# Patient Record
Sex: FEMALE | Race: WHITE | NOT HISPANIC OR LATINO | URBAN - METROPOLITAN AREA
[De-identification: names, ages, dates, MRNs, and addresses within clinical notes are randomized per-mention and may not be internally consistent; named-entity substitution may affect disease eponyms.]

---

## 2024-02-17 ENCOUNTER — OFFICE VISIT (OUTPATIENT)
Dept: URGENT CARE | Facility: CLINIC | Age: 44
End: 2024-02-17
Payer: COMMERCIAL

## 2024-02-17 VITALS
BODY MASS INDEX: 32.36 KG/M2 | HEART RATE: 82 BPM | DIASTOLIC BLOOD PRESSURE: 87 MMHG | TEMPERATURE: 97.5 F | HEIGHT: 65 IN | OXYGEN SATURATION: 98 % | WEIGHT: 194.2 LBS | SYSTOLIC BLOOD PRESSURE: 148 MMHG

## 2024-02-17 DIAGNOSIS — R19.5 CHANGE IN CONSISTENCY OF STOOL: ICD-10-CM

## 2024-02-17 DIAGNOSIS — R82.998 LEUKOCYTES IN URINE: ICD-10-CM

## 2024-02-17 DIAGNOSIS — R10.30 LOWER ABDOMINAL PAIN: ICD-10-CM

## 2024-02-17 DIAGNOSIS — R19.5 MUCUS IN STOOL: Primary | ICD-10-CM

## 2024-02-17 LAB
POC APPEARANCE, URINE: CLEAR
POC BILIRUBIN, URINE: NEGATIVE
POC BLOOD, URINE: ABNORMAL
POC COLOR, URINE: ABNORMAL
POC GLUCOSE, URINE: NEGATIVE MG/DL
POC KETONES, URINE: NEGATIVE MG/DL
POC LEUKOCYTES, URINE: ABNORMAL
POC NITRITE,URINE: NEGATIVE
POC PH, URINE: 6 PH
POC PROTEIN, URINE: NEGATIVE MG/DL
POC SPECIFIC GRAVITY, URINE: 1.01
POC UROBILINOGEN, URINE: 0.2 EU/DL

## 2024-02-17 PROCEDURE — 87086 URINE CULTURE/COLONY COUNT: CPT

## 2024-02-17 PROCEDURE — 99214 OFFICE O/P EST MOD 30 MIN: CPT | Performed by: PHYSICIAN ASSISTANT

## 2024-02-17 PROCEDURE — 81003 URINALYSIS AUTO W/O SCOPE: CPT | Mod: CLIA WAIVED TEST | Performed by: PHYSICIAN ASSISTANT

## 2024-02-17 RX ORDER — DICYCLOMINE HYDROCHLORIDE 20 MG/1
20 TABLET ORAL 4 TIMES DAILY PRN
Qty: 56 TABLET | Refills: 0 | Status: SHIPPED | OUTPATIENT
Start: 2024-02-17 | End: 2024-03-02

## 2024-02-17 NOTE — PROGRESS NOTES
"Kenzie Maloney is a 44 y.o. female who presents for Abdominal Pain.    Pt states that she started 2 weeks ago she started with liquid, yellow, mucous stool. Reports that her stomach has been swollen as well. States that she had a smoothie with orange juice and shortly after, she began having significant vomiting and they mucousy diarrhea. Tried naproxen for her abdominal pain and Gas-X without relief. She has been unable to eat a substantial amount because shortly after she eats, her stomach pain gets worse. States that when she had gas and stool \"had an abnormally awful smell - she has never smelled anything like that before.\" Denies having abx before these Sx started. Her mom has diverticulosis and bouts of diverticulitis. States that she has been feeling very tired, weak. Thought it was something she ate, but it has now lasted too long. Denies seeing any blood in the stool, no black stools. States that she was having around 1-2 BM daily. Denies any continued nausea or vomiting. Denies any continued fevers. She has been taking a lot of fluids to compensate. The stool is still yellow, but getting slightly more solid.       History provided by:  Patient   used: No        /87 (BP Location: Left arm, Patient Position: Sitting, BP Cuff Size: Small adult)   Pulse 82   Temp 36.4 °C (97.5 °F) (Oral)   Ht 1.651 m (5' 5\")   Wt 88.1 kg (194 lb 3.2 oz)   LMP 02/14/2024   SpO2 98%   BMI 32.32 kg/m²    All vitals have been reviewed and are stable.    Review of Systems   All other systems reviewed and are negative.      Objective     Physical Exam  Vitals reviewed.   Constitutional:       General: She is awake.      Appearance: Normal appearance. She is well-developed.   HENT:      Head: Normocephalic and atraumatic.   Cardiovascular:      Rate and Rhythm: Normal rate.   Pulmonary:      Effort: Pulmonary effort is normal.   Abdominal:      General: Bowel sounds are normal. There is " distension (mild in the lower abdomen).      Palpations: Abdomen is soft. There is no pulsatile mass.      Tenderness: There is generalized abdominal tenderness (lower abdomen). Negative signs include Beltre's sign, Rovsing's sign and McBurney's sign.      Hernia: No hernia is present.   Musculoskeletal:      Cervical back: Full passive range of motion without pain.      Right lower leg: No edema.      Left lower leg: No edema.   Skin:     General: Skin is warm and dry.      Findings: No lesion or rash.   Neurological:      General: No focal deficit present.      Mental Status: She is alert and oriented to person, place, and time.      Cranial Nerves: No facial asymmetry.      Motor: Motor function is intact.      Gait: Gait is intact.   Psychiatric:         Attention and Perception: Attention normal.         Mood and Affect: Mood and affect normal.         Assessment/Plan   Problem List Items Addressed This Visit    None  Visit Diagnoses       Mucus in stool    -  Primary    Relevant Orders    Stool Pathogen Panel, PCR    Lactoferrin, Fecal, Quantitative    C. difficile, PCR    Ova/Para + Giardia/Cryptosporidium Antigen    Lower abdominal pain        Relevant Medications    dicyclomine (Bentyl) 20 mg tablet    Other Relevant Orders    POCT UA Automated manually resulted (Completed)    Change in consistency of stool        Relevant Orders    Stool Pathogen Panel, PCR    Lactoferrin, Fecal, Quantitative    C. difficile, PCR    Ova/Para + Giardia/Cryptosporidium Antigen          Bentyl Rx for symptoms. Advised that stool studies would be an appropriate first step in evaluation. Should provide a loose sample if possible. Given stool sample kit and lab hour so pt knows a good time to drop these off. If at any time significantly bloody or any black stools, or worsening abd pain should report to ER for further evaluation. UA relatively unremarkable aside from trace leuks, will send for culture. Blood likely secondary to  active menstruation.     Red flag symptoms reviewed with patient and all questions answered. Patient or parent/guardian verbalized understanding and agreement with care plan as above. All in office testing reviewed with patient. If symptoms worsen or do not improve, patient is to follow up with PCP or report to the ER.

## 2024-02-19 LAB — BACTERIA UR CULT: NO GROWTH

## 2024-07-12 ENCOUNTER — OFFICE VISIT (OUTPATIENT)
Dept: URGENT CARE | Facility: CLINIC | Age: 44
End: 2024-07-12
Payer: COMMERCIAL

## 2024-07-12 VITALS
TEMPERATURE: 98.3 F | OXYGEN SATURATION: 96 % | DIASTOLIC BLOOD PRESSURE: 95 MMHG | SYSTOLIC BLOOD PRESSURE: 135 MMHG | HEART RATE: 80 BPM

## 2024-07-12 DIAGNOSIS — M79.604 BILATERAL LEG AND FOOT PAIN: ICD-10-CM

## 2024-07-12 DIAGNOSIS — M79.672 BILATERAL LEG AND FOOT PAIN: ICD-10-CM

## 2024-07-12 DIAGNOSIS — M79.671 BILATERAL LEG AND FOOT PAIN: ICD-10-CM

## 2024-07-12 DIAGNOSIS — M79.605 BILATERAL LEG AND FOOT PAIN: ICD-10-CM

## 2024-07-12 DIAGNOSIS — R11.2 NAUSEA AND VOMITING, UNSPECIFIED VOMITING TYPE: ICD-10-CM

## 2024-07-12 DIAGNOSIS — M54.50 ACUTE BILATERAL LOW BACK PAIN, UNSPECIFIED WHETHER SCIATICA PRESENT: Primary | ICD-10-CM

## 2024-07-12 PROBLEM — J20.9 ACUTE BRONCHITIS: Status: RESOLVED | Noted: 2024-07-12 | Resolved: 2024-07-12

## 2024-07-12 PROBLEM — R07.9 CHEST PAIN, UNSPECIFIED: Status: RESOLVED | Noted: 2023-05-08 | Resolved: 2024-07-12

## 2024-07-12 PROBLEM — D25.9 UTERINE LEIOMYOMA: Status: ACTIVE | Noted: 2024-07-12

## 2024-07-12 PROBLEM — B48.8 OPPORTUNISTIC MYCOSIS (MULTI): Status: RESOLVED | Noted: 2024-07-12 | Resolved: 2024-07-12

## 2024-07-12 PROBLEM — M54.40 ACUTE BACK PAIN WITH SCIATICA: Status: ACTIVE | Noted: 2024-07-12

## 2024-07-12 PROBLEM — Z87.891 PERSONAL HISTORY OF NICOTINE DEPENDENCE: Status: ACTIVE | Noted: 2023-05-08

## 2024-07-12 PROBLEM — R35.0 URINARY FREQUENCY: Status: RESOLVED | Noted: 2024-07-12 | Resolved: 2024-07-12

## 2024-07-12 LAB
POC APPEARANCE, URINE: CLEAR
POC BILIRUBIN, URINE: NEGATIVE
POC BLOOD, URINE: ABNORMAL
POC COLOR, URINE: YELLOW
POC GLUCOSE, URINE: NEGATIVE MG/DL
POC KETONES, URINE: NEGATIVE MG/DL
POC LEUKOCYTES, URINE: NEGATIVE
POC NITRITE,URINE: NEGATIVE
POC PH, URINE: 7 PH
POC PROTEIN, URINE: NEGATIVE MG/DL
POC SPECIFIC GRAVITY, URINE: 1.02
POC UROBILINOGEN, URINE: 0.2 EU/DL

## 2024-07-12 RX ORDER — TIZANIDINE 4 MG/1
4 TABLET ORAL EVERY 8 HOURS PRN
Qty: 21 TABLET | Refills: 0 | Status: SHIPPED | OUTPATIENT
Start: 2024-07-12 | End: 2024-07-19

## 2024-07-12 RX ORDER — KETOROLAC TROMETHAMINE 30 MG/ML
60 INJECTION, SOLUTION INTRAMUSCULAR; INTRAVENOUS ONCE
Status: COMPLETED | OUTPATIENT
Start: 2024-07-12 | End: 2024-07-12

## 2024-07-12 RX ORDER — ONDANSETRON 4 MG/1
4 TABLET, FILM COATED ORAL EVERY 8 HOURS PRN
Qty: 20 TABLET | Refills: 0 | Status: SHIPPED | OUTPATIENT
Start: 2024-07-12 | End: 2024-07-19

## 2024-07-12 RX ORDER — PREDNISONE 10 MG/1
TABLET ORAL
Qty: 25 TABLET | Refills: 0 | Status: SHIPPED | OUTPATIENT
Start: 2024-07-12 | End: 2024-07-23

## 2024-07-12 ASSESSMENT — ENCOUNTER SYMPTOMS
CONSTITUTIONAL NEGATIVE: 1
LOSS OF SENSATION: 0
INABILITY TO BEAR WEIGHT: 0
VOMITING: 0
RESPIRATORY NEGATIVE: 1
TINGLING: 0
NUMBNESS: 0
MUSCLE WEAKNESS: 0
DYSURIA: 0
RHINORRHEA: 0
NEUROLOGICAL NEGATIVE: 1
DIARRHEA: 0
NECK PAIN: 0
FLANK PAIN: 0
ARTHRALGIAS: 1
DIZZINESS: 0
NAUSEA: 0
SORE THROAT: 0
FEVER: 0
TROUBLE SWALLOWING: 0
VOICE CHANGE: 0
BACK PAIN: 1
GASTROINTESTINAL NEGATIVE: 1
LEG PAIN: 1
FATIGUE: 0
DIAPHORESIS: 0
LOSS OF MOTION: 0
FACIAL SWELLING: 0
COUGH: 0
WOUND: 0
JOINT SWELLING: 0
MYALGIAS: 0
HEADACHES: 0
FREQUENCY: 0
BRUISES/BLEEDS EASILY: 1
SHORTNESS OF BREATH: 0
CHILLS: 0
CARDIOVASCULAR NEGATIVE: 1
PALPITATIONS: 0
ABDOMINAL PAIN: 0
COLOR CHANGE: 0

## 2024-07-12 ASSESSMENT — VISUAL ACUITY: OU: 1

## 2024-07-12 NOTE — PATIENT INSTRUCTIONS
BACK PAIN / MUSCLE SPASM    - TORADOL injection was provided in office for more rapid pain/inflammation relief  - Xray of the low back was ordered to evaluate for malalignment/injury  - TIZANIDINE is a muscle relaxer that helps reduce pain-inducing spasms  - PREDNISONE (steroid) has been prescribed to reduce nerve inflammation and pain    - Resting the affected areas is critical to healing  - NSAIDs (Ibuprofen or naproxen) may be taken every 4-6 hours for pain relief and decreased inflammation  - Use Ice (or other cold object) for at least 6 hours after the injury. Some people find it helpful to ice up to 2 days after an injury. Use cold gel pack, bag of ice, or bag of frozen vegetables on the painful muscle for 15 min, every 1 to 2 hours.  Put a thin towel between the ice (or other cold object) and your skin.  - Alternate icing and heating if possible with heating pads or warm water  - Gentle stretching without weight bearing can help prevent immobility while activity is decreased  - Yoga or seeking out chiropractor care may be beneficial    - If symptoms persist or do not improve, follow up with an orthopedic or rheumatologic specialist    - If loss of bladder or bowel control, numbness of groin region, numbness or weakness of lower extremities occurs, or symptoms continue to worsen go to the ER immediately    Follow up with PCP in 4-6 weeks, if you need a referral, please call our office.     Steroids are strong medications that mimic the body's natural production of cortisol and is prescribed to reduce inflammation especially when pain is caused by inflammation. Short-term side effects of steroids reviewed, including gastritis, insomnia, moodiness, acne, fluid retention (leg swelling) and potential for increase in blood pressure or sugar. If you have have a history of hypertension or diabetes, you should monitor at home regularly while on this medication and discontinue if levels are high (BP>160/90 or glucose  >200). Steroids are often prescribed to be tapered off into lower doses after prolonged use to avoid withdrawal symptoms such as fatigue, weakness, body aches, nausea, and lightheadedness.  However, if a severe reaction is noted (including rash or difficulty breathing) discontinue and seek emergency care immediately.

## 2024-07-12 NOTE — PROGRESS NOTES
Subjective   History  Ramila Maloney is a 44 y.o. female who presents for Back Pain and Leg Pain.    Patient presents with bilateral leg pain that started in her feet and then slowly moved up her legs and to her low back pain over the last week. She reports constant throbbing, cramping, and sore feeling of her legs and no relief with rest or changes in position. She reports that it is so painful she feels like she could cry, but has been holding it back and she is starting to get nauseous. She reports that the heat and ice both made her pain much worse and she feels like her skin is very sensitive like any breeze across her legs would make the pain worse. Patient denies any injury or other changes in lifestyle, numbness/tingling sensations, pain in her hands, numbness in her pelvic region, or any urinary discomfort/changes. She reports that she does usually get chronic pain of her feet and has had sciatica before, but this started differently and is equal and generalized throughout her whole legs. Patient reports trying OTC medications including Acetaminophen/Ibuprofen, rest, elevation, heat and ice with minimal relief. Patient reports that rubbing her legs seems to help reduce the pain only while she is doing it. She is currently on her period.       History provided by:  Patient and medical records  Back Pain  Associated symptoms include leg pain. Pertinent negatives include no abdominal pain, chest pain, dysuria, fever, headaches, numbness, pelvic pain or tingling.   Leg Pain   There was no injury mechanism. The quality of the pain is described as cramping, burning and aching. The pain is severe. The pain has been Constant since onset. Pertinent negatives include no inability to bear weight, loss of motion, loss of sensation, muscle weakness, numbness or tingling. The symptoms are aggravated by movement. She has tried elevation, ice, rest, NSAIDs, heat and acetaminophen for the symptoms. The treatment provided no  relief.     No past surgical history on file.  Social History     Tobacco Use    Smoking status: Not on file    Smokeless tobacco: Not on file   Substance Use Topics    Alcohol use: Not on file    Drug use: Not on file       Review of Systems   Review of Systems   Constitutional: Negative.  Negative for chills, diaphoresis, fatigue and fever.   HENT: Negative.  Negative for congestion, facial swelling, rhinorrhea, sore throat, trouble swallowing and voice change.    Respiratory: Negative.  Negative for cough and shortness of breath.    Cardiovascular: Negative.  Negative for chest pain and palpitations.   Gastrointestinal: Negative.  Negative for abdominal pain, diarrhea, nausea and vomiting.   Genitourinary:  Positive for vaginal bleeding (menses). Negative for dysuria, enuresis, flank pain, frequency, genital sores, pelvic pain and vaginal pain.   Musculoskeletal:  Positive for arthralgias and back pain. Negative for gait problem, joint swelling, myalgias and neck pain.   Skin: Negative.  Negative for color change, rash and wound.   Neurological: Negative.  Negative for dizziness, tingling, numbness and headaches.   Hematological:  Bruises/bleeds easily.       Objective   Vital Signs  BP (!) 135/95   Pulse 80   Temp 36.8 °C (98.3 °F)   LMP  (LMP Unknown)   SpO2 96%    All vitals have been reviewed and are stable.     Physical Exam  Physical Exam  Vitals and nursing note reviewed.   Constitutional:       General: She is awake. She is not in acute distress.     Appearance: Normal appearance. She is well-developed, well-groomed and normal weight. She is ill-appearing. She is not toxic-appearing or diaphoretic.   HENT:      Head: Normocephalic and atraumatic. No right periorbital erythema or left periorbital erythema.      Jaw: There is normal jaw occlusion.      Right Ear: External ear normal.      Left Ear: External ear normal.      Nose: Nose normal. No congestion or rhinorrhea.      Mouth/Throat:      Lips:  Pink. No lesions.      Mouth: Mucous membranes are moist. No oral lesions or angioedema.      Pharynx: Oropharynx is clear.   Eyes:      General: Lids are normal. Vision grossly intact. Gaze aligned appropriately.      Extraocular Movements: Extraocular movements intact.      Conjunctiva/sclera: Conjunctivae normal.      Right eye: Right conjunctiva is not injected.      Left eye: Left conjunctiva is not injected.      Pupils: Pupils are equal, round, and reactive to light.   Cardiovascular:      Rate and Rhythm: Normal rate and regular rhythm.   Pulmonary:      Effort: Pulmonary effort is normal. No tachypnea or respiratory distress.      Breath sounds: Normal air entry. No stridor. No wheezing.   Abdominal:      General: Abdomen is flat. There is no distension.      Palpations: Abdomen is soft.   Musculoskeletal:         General: No swelling or deformity. Normal range of motion.      Cervical back: Normal, full passive range of motion without pain, normal range of motion and neck supple.      Thoracic back: Normal.      Lumbar back: Tenderness (across lumbar musculature) present. No swelling, spasms or bony tenderness. Normal range of motion.      Right upper leg: Tenderness present. No swelling, deformity or bony tenderness.      Left upper leg: Tenderness present. No swelling, deformity or bony tenderness.      Right knee: Normal. No swelling or erythema. Normal range of motion.      Left knee: Normal. No swelling or erythema. Normal range of motion.      Right lower leg: Tenderness present. No swelling, deformity or bony tenderness. No edema.      Left lower leg: Tenderness present. No swelling, deformity or bony tenderness. No edema.      Right ankle: Normal. No swelling. Normal range of motion.      Left ankle: Normal. No swelling. Normal range of motion.      Right foot: Normal range of motion. Tenderness present. No swelling, deformity or bony tenderness. Normal pulse.      Left foot: Normal range of  motion. Tenderness present. No swelling, deformity or bony tenderness. Normal pulse.   Skin:     General: Skin is warm and dry.      Coloration: Skin is not pale or sallow.      Findings: Bruising (2 small spots on right lower leg) present. No abrasion, abscess, ecchymosis, erythema, signs of injury, lesion, petechiae, rash or wound.   Neurological:      General: No focal deficit present.      Mental Status: She is alert and oriented to person, place, and time. Mental status is at baseline.      Motor: Motor function is intact.      Coordination: Coordination is intact.   Psychiatric:         Mood and Affect: Mood and affect normal.         Speech: Speech normal.         Behavior: Behavior normal. Behavior is cooperative.         Thought Content: Thought content normal.         Judgment: Judgment normal.         Diagnostic Results   Recent Results (from the past 48 hour(s))   POCT UA Automated manually resulted    Collection Time: 07/12/24  1:52 PM   Result Value Ref Range    POC Color, Urine Yellow Straw, Yellow, Light-Yellow    POC Appearance, Urine Clear Clear    POC Glucose, Urine NEGATIVE NEGATIVE mg/dl    POC Bilirubin, Urine NEGATIVE NEGATIVE    POC Ketones, Urine NEGATIVE NEGATIVE mg/dl    POC Specific Gravity, Urine 1.020 1.005 - 1.035    POC Blood, Urine MODERATE (2+) (A) NEGATIVE    POC PH, Urine 7.0 No Reference Range Established PH    POC Protein, Urine NEGATIVE NEGATIVE, 30 (1+) mg/dl    POC Urobilinogen, Urine 0.2 0.2, 1.0 EU/DL    Poc Nitrite, Urine NEGATIVE NEGATIVE    POC Leukocytes, Urine NEGATIVE NEGATIVE       Assessment/Plan   Procedures   N/A    Problem List Items Addressed This Visit    None  Visit Diagnoses       Acute bilateral low back pain, unspecified whether sciatica present    -  Primary    Relevant Medications    predniSONE (Deltasone) 10 mg tablet    tiZANidine (Zanaflex) 4 mg tablet    ketorolac (Toradol) injection 60 mg (Completed)    Other Relevant Orders    POCT UA Automated  manually resulted (Completed)    XR lumbar spine 2-3 views    Nausea and vomiting, unspecified vomiting type        Relevant Medications    ondansetron (Zofran) 4 mg tablet    Bilateral leg and foot pain        Relevant Medications    predniSONE (Deltasone) 10 mg tablet    tiZANidine (Zanaflex) 4 mg tablet    ketorolac (Toradol) injection 60 mg (Completed)    Other Relevant Orders    XR lumbar spine 2-3 views            UC Course  MDM    Patient disposition: Home    Red flags for reporting to ER have been reviewed with the patient.    Current diagnosis, any medication changes, and all in-office lab or radiologic results have been reviewed with the patient at the time of the visit.   If symptoms do not improve or worsen, patient is to follow up with PCP or report to the emergency room.   Patient is alert and oriented x3 and non-toxic appearing. Vital signs are stable.   Patient and/or guardian has sufficient decision-making capabilities at this time and reports understanding and agreement with the treatment plan made through shared decision-making.